# Patient Record
Sex: FEMALE | Race: WHITE | NOT HISPANIC OR LATINO | Employment: FULL TIME | URBAN - NONMETROPOLITAN AREA
[De-identification: names, ages, dates, MRNs, and addresses within clinical notes are randomized per-mention and may not be internally consistent; named-entity substitution may affect disease eponyms.]

---

## 2021-04-07 ENCOUNTER — OFFICE VISIT (OUTPATIENT)
Dept: CARDIOLOGY | Facility: CLINIC | Age: 56
End: 2021-04-07

## 2021-04-07 VITALS
BODY MASS INDEX: 34.35 KG/M2 | WEIGHT: 201.2 LBS | OXYGEN SATURATION: 96 % | SYSTOLIC BLOOD PRESSURE: 115 MMHG | HEART RATE: 70 BPM | HEIGHT: 64 IN | DIASTOLIC BLOOD PRESSURE: 76 MMHG

## 2021-04-07 DIAGNOSIS — R00.2 PALPITATIONS: Primary | ICD-10-CM

## 2021-04-07 DIAGNOSIS — R55 PRE-SYNCOPE: ICD-10-CM

## 2021-04-07 DIAGNOSIS — R07.2 PRECORDIAL PAIN: ICD-10-CM

## 2021-04-07 DIAGNOSIS — R06.02 SHORTNESS OF BREATH: ICD-10-CM

## 2021-04-07 DIAGNOSIS — R42 DIZZINESS: ICD-10-CM

## 2021-04-07 PROCEDURE — 99204 OFFICE O/P NEW MOD 45 MIN: CPT | Performed by: PHYSICIAN ASSISTANT

## 2021-04-07 PROCEDURE — 93000 ELECTROCARDIOGRAM COMPLETE: CPT | Performed by: PHYSICIAN ASSISTANT

## 2021-04-07 RX ORDER — MELOXICAM 15 MG/1
15 TABLET ORAL DAILY
COMMUNITY

## 2021-04-07 RX ORDER — PAROXETINE HYDROCHLORIDE 20 MG/1
20 TABLET, FILM COATED ORAL EVERY MORNING
COMMUNITY

## 2021-04-07 RX ORDER — ASPIRIN 81 MG/1
81 TABLET, CHEWABLE ORAL DAILY
COMMUNITY

## 2021-04-07 RX ORDER — VERAPAMIL HYDROCHLORIDE 120 MG/1
120 TABLET, FILM COATED ORAL DAILY
COMMUNITY

## 2021-04-07 RX ORDER — OMEPRAZOLE 20 MG/1
20 CAPSULE, DELAYED RELEASE ORAL 2 TIMES DAILY
COMMUNITY

## 2021-04-07 RX ORDER — FUROSEMIDE 40 MG/1
40 TABLET ORAL AS NEEDED
COMMUNITY

## 2021-04-07 NOTE — PROGRESS NOTES
Subjective   Poly Tse is a 55 y.o. female     Chief Complaint   Patient presents with   • Establish Care   • Loss of Consciousness   • Shortness of Breath       HPI  The patient presents into the clinic today for initial evaluation.  The patient is referred to the clinic today because of presyncope, palpitations, chest tightness, and dyspnea.  She apparently had a catheterization a number of years ago which was benign at the time.  She was lost to follow-up through cardiology but is now referred back because of symptoms as above.  She tells me that she has always experienced episodes of dizziness and presyncope.  She has always experienced exertional chest tightness.  She had a recent episode however where she was sitting at home.  She had dizziness for a timeframe of about 20 to 30 minutes.  This was clearly lightheadedness not necessarily vertigo by her description.  She had onset of weakness after that.  Eventually, she nearly lost consciousness.  She can remember associated tachycardia and palpitations otherwise at that time.  She did develop chest tightness.  Her episode lasted for 5 to 10 minutes and eventually subsided, but she had marked weakness throughout the next day, and possibly even extending out to a couple of days after that episode.  Since that time, she has had intermittent episodes of a rapid heartbeat as well as an irregular heartbeat.  She has chest tightness at that time.  She did talk to her primary care provider about the symptoms and has subsequently been referred on to the clinic today for further evaluation.      Current Outpatient Medications   Medication Sig Dispense Refill   • aspirin 81 MG chewable tablet Chew 81 mg Daily.     • furosemide (LASIX) 40 MG tablet Take 40 mg by mouth As Needed.     • meloxicam (MOBIC) 15 MG tablet Take 15 mg by mouth Daily.     • omeprazole (priLOSEC) 20 MG capsule Take 20 mg by mouth 2 (two) times a day.     • PARoxetine (PAXIL) 20 MG tablet Take 20 mg  by mouth Every Morning.     • verapamil (CALAN) 120 MG tablet Take 120 mg by mouth Daily.       No current facility-administered medications for this visit.       Patient has no known allergies.    Past Medical History:   Diagnosis Date   • Hyperlipidemia        Social History     Socioeconomic History   • Marital status:      Spouse name: Not on file   • Number of children: Not on file   • Years of education: Not on file   • Highest education level: Not on file   Tobacco Use   • Smoking status: Former Smoker   • Smokeless tobacco: Never Used   Substance and Sexual Activity   • Alcohol use: Never   • Drug use: Never   • Sexual activity: Defer       Family History   Problem Relation Age of Onset   • Heart disease Mother    • Diabetes Mother    • Thyroid disease Mother    • Hyperlipidemia Mother    • Heart disease Father    • Hyperlipidemia Father    • Parkinsonism Maternal Grandmother    • Lung cancer Maternal Grandfather        Review of Systems   Constitutional: Positive for fatigue. Negative for chills and fever.   HENT: Negative for congestion, rhinorrhea and sore throat.    Eyes: Positive for visual disturbance (glasses).   Respiratory: Positive for chest tightness and shortness of breath. Negative for wheezing.    Cardiovascular: Positive for palpitations. Negative for chest pain and leg swelling.   Gastrointestinal: Negative.    Endocrine: Negative.  Negative for cold intolerance.   Genitourinary: Negative.    Musculoskeletal: Positive for arthralgias, back pain and neck pain.   Skin: Negative.  Negative for rash and wound.   Allergic/Immunologic: Positive for environmental allergies.   Neurological: Positive for dizziness, weakness, numbness (fingers ) and headaches.   Hematological: Bruises/bleeds easily (bruises).   Psychiatric/Behavioral: Negative for sleep disturbance.       Objective     Vitals:    04/07/21 1444 04/07/21 1445 04/07/21 1446 04/07/21 1447   BP: 118/76 113/62 117/74 115/76   BP  "Location: Right arm Left arm Left arm Left arm   Patient Position: Lying Lying Sitting Standing   Pulse: 58 59 65 70   SpO2: 96% 97% 97% 96%   Weight:       Height:            /76 (BP Location: Left arm, Patient Position: Standing)   Pulse 70   Ht 162.6 cm (64\")   Wt 91.3 kg (201 lb 3.2 oz)   SpO2 96%   BMI 34.54 kg/m²      Lab Results (most recent)     None          Physical Exam  Vitals and nursing note reviewed.   Constitutional:       General: She is not in acute distress.     Appearance: She is well-developed.   HENT:      Head: Normocephalic and atraumatic.   Eyes:      Conjunctiva/sclera: Conjunctivae normal.      Pupils: Pupils are equal, round, and reactive to light.   Neck:      Vascular: No JVD.      Trachea: No tracheal deviation.   Cardiovascular:      Rate and Rhythm: Normal rate and regular rhythm.      Heart sounds: Normal heart sounds.   Pulmonary:      Effort: Pulmonary effort is normal.      Breath sounds: Normal breath sounds.   Abdominal:      General: Bowel sounds are normal. There is no distension.      Palpations: Abdomen is soft. There is no mass.      Tenderness: There is no abdominal tenderness. There is no guarding or rebound.   Musculoskeletal:         General: No tenderness or deformity. Normal range of motion.      Cervical back: Normal range of motion and neck supple.   Skin:     General: Skin is warm and dry.      Coloration: Skin is not pale.      Findings: No erythema or rash.   Neurological:      Mental Status: She is alert and oriented to person, place, and time.   Psychiatric:         Behavior: Behavior normal.         Thought Content: Thought content normal.         Judgment: Judgment normal.         Procedure     ECG 12 Lead    Date/Time: 4/7/2021 2:06 PM  Performed by: Gregg Dillon PA  Authorized by: Gregg Dillon PA   Comparison: not compared with previous ECG   Comments: Sinus rhythm, rate 61, normal axis, no acute changes noted.                 "       Assessment/Plan      Diagnosis Plan   1. Palpitations  Cardiac Event Monitor    Stress Test With Myocardial Perfusion    Adult Transthoracic Echo Complete W/ Cont if Necessary Per Protocol    US Carotid Bilateral   2. Pre-syncope  Cardiac Event Monitor    Stress Test With Myocardial Perfusion    Adult Transthoracic Echo Complete W/ Cont if Necessary Per Protocol    US Carotid Bilateral   3. Dizziness  Cardiac Event Monitor    Stress Test With Myocardial Perfusion    Adult Transthoracic Echo Complete W/ Cont if Necessary Per Protocol    US Carotid Bilateral   4. Precordial pain     5. Shortness of breath     1.  With the patient's episode of presyncope with associated dizziness, palpitations, chest discomfort, and complications otherwise all as outlined above, I feel that she needs further evaluation.    2.  We will schedule for an event monitor to screen for dysrhythmic substrates.  She will have an evaluation for 2 weeks with the event monitor.    3.  She also needs an ischemia assessment because of her clinical scenario as above.  We will schedule for treadmill nuclear stress testing.    4.  She will be scheduled for an echo so that we may evaluate LV size and function, valvular morphologies, right-sided parameters, etc.    5.  We will schedule for carotid duplex to evaluate for carotid and vertebral flow.    6.  For now, I would make no adjustments in her medical regimen.  We will see her back after the above and recommend her further at that time.  She will call immediately for ongoing issues.           Poly Dedrick  reports that she has quit smoking. She has never used smokeless tobacco..           Patient's Body mass index is 34.54 kg/m². BMI is above normal parameters. Recommendations include: educational material.           Electronically signed by:

## 2021-04-07 NOTE — PATIENT INSTRUCTIONS

## 2021-05-10 ENCOUNTER — HOSPITAL ENCOUNTER (OUTPATIENT)
Dept: CARDIOLOGY | Facility: HOSPITAL | Age: 56
Discharge: HOME | End: 2021-05-10

## 2021-05-10 DIAGNOSIS — R42 DIZZINESS: ICD-10-CM

## 2021-05-10 DIAGNOSIS — R00.2 PALPITATIONS: ICD-10-CM

## 2021-05-10 DIAGNOSIS — R55 PRE-SYNCOPE: ICD-10-CM

## 2021-05-10 PROCEDURE — 78452 HT MUSCLE IMAGE SPECT MULT: CPT

## 2021-05-10 PROCEDURE — 93306 TTE W/DOPPLER COMPLETE: CPT

## 2021-05-10 PROCEDURE — 78452 HT MUSCLE IMAGE SPECT MULT: CPT | Performed by: INTERNAL MEDICINE

## 2021-05-10 PROCEDURE — 93306 TTE W/DOPPLER COMPLETE: CPT | Performed by: INTERNAL MEDICINE

## 2021-05-10 PROCEDURE — 0 TECHNETIUM SESTAMIBI: Performed by: INTERNAL MEDICINE

## 2021-05-10 PROCEDURE — 93880 EXTRACRANIAL BILAT STUDY: CPT | Performed by: INTERNAL MEDICINE

## 2021-05-10 PROCEDURE — A9500 TC99M SESTAMIBI: HCPCS | Performed by: INTERNAL MEDICINE

## 2021-05-10 PROCEDURE — 93880 EXTRACRANIAL BILAT STUDY: CPT

## 2021-05-10 PROCEDURE — 93018 CV STRESS TEST I&R ONLY: CPT | Performed by: INTERNAL MEDICINE

## 2021-05-10 PROCEDURE — 93017 CV STRESS TEST TRACING ONLY: CPT

## 2021-05-10 RX ADMIN — TECHNETIUM TC 99M SESTAMIBI 1 DOSE: 1 INJECTION INTRAVENOUS at 12:43

## 2021-05-10 RX ADMIN — TECHNETIUM TC 99M SESTAMIBI 1 DOSE: 1 INJECTION INTRAVENOUS at 15:23

## 2021-05-12 LAB
BH CV REST NUCLEAR ISOTOPE DOSE: 10 MCI
BH CV STRESS NUCLEAR ISOTOPE DOSE: 30 MCI
BH CV STRESS RECOVERY BP: NORMAL MMHG
BH CV STRESS RECOVERY HR: 99 BPM
MAXIMAL PREDICTED HEART RATE: 167 BPM
PERCENT MAX PREDICTED HR: 92.22 %
STRESS BASELINE BP: NORMAL MMHG
STRESS BASELINE HR: 76 BPM
STRESS PERCENT HR: 108 %
STRESS POST ESTIMATED WORKLOAD: 7 METS
STRESS POST EXERCISE DUR MIN: 4 MIN
STRESS POST EXERCISE DUR SEC: 40 SEC
STRESS POST PEAK BP: NORMAL MMHG
STRESS POST PEAK HR: 154 BPM
STRESS TARGET HR: 142 BPM

## 2021-05-13 ENCOUNTER — TELEPHONE (OUTPATIENT)
Dept: CARDIOLOGY | Facility: CLINIC | Age: 56
End: 2021-05-13

## 2021-05-13 NOTE — TELEPHONE ENCOUNTER
Spoke with patient on stress test result - everything looked good , keep routine follow up     Patient verbalized understanding    AT [facility]      ----- Message from JOSEF Cunha sent at 5/12/2021  5:51 PM EDT -----  Routine follow-up.

## 2021-05-29 LAB
BH CV ECHO MEAS - ACS: 2 CM
BH CV ECHO MEAS - AO MAX PG: 8.2 MMHG
BH CV ECHO MEAS - AO MEAN PG: 5 MMHG
BH CV ECHO MEAS - AO ROOT AREA (BSA CORRECTED): 1.6
BH CV ECHO MEAS - AO ROOT AREA: 7.5 CM^2
BH CV ECHO MEAS - AO ROOT DIAM: 3.1 CM
BH CV ECHO MEAS - AO V2 MAX: 143 CM/SEC
BH CV ECHO MEAS - AO V2 MEAN: 102 CM/SEC
BH CV ECHO MEAS - AO V2 VTI: 33.1 CM
BH CV ECHO MEAS - BSA(HAYCOCK): 2.1 M^2
BH CV ECHO MEAS - BSA: 2 M^2
BH CV ECHO MEAS - BZI_BMI: 34.5 KILOGRAMS/M^2
BH CV ECHO MEAS - BZI_METRIC_HEIGHT: 162.6 CM
BH CV ECHO MEAS - BZI_METRIC_WEIGHT: 91.2 KG
BH CV ECHO MEAS - EDV(CUBED): 67.9 ML
BH CV ECHO MEAS - EDV(MOD-SP4): 94.2 ML
BH CV ECHO MEAS - EDV(TEICH): 73.4 ML
BH CV ECHO MEAS - EF(CUBED): 87.9 %
BH CV ECHO MEAS - EF(MOD-SP4): 53.1 %
BH CV ECHO MEAS - EF(TEICH): 82.2 %
BH CV ECHO MEAS - ESV(CUBED): 8.2 ML
BH CV ECHO MEAS - ESV(MOD-SP4): 44.2 ML
BH CV ECHO MEAS - ESV(TEICH): 13.1 ML
BH CV ECHO MEAS - FS: 50.5 %
BH CV ECHO MEAS - IVS/LVPW: 1.2
BH CV ECHO MEAS - IVSD: 1.2 CM
BH CV ECHO MEAS - LA DIMENSION: 3.8 CM
BH CV ECHO MEAS - LA/AO: 1.2
BH CV ECHO MEAS - LV DIASTOLIC VOL/BSA (35-75): 48.1 ML/M^2
BH CV ECHO MEAS - LV IVRT: 0.11 SEC
BH CV ECHO MEAS - LV MASS(C)D: 151.4 GRAMS
BH CV ECHO MEAS - LV MASS(C)DI: 77.3 GRAMS/M^2
BH CV ECHO MEAS - LV SYSTOLIC VOL/BSA (12-30): 22.5 ML/M^2
BH CV ECHO MEAS - LVIDD: 4.1 CM
BH CV ECHO MEAS - LVIDS: 2 CM
BH CV ECHO MEAS - LVLD AP4: 8.6 CM
BH CV ECHO MEAS - LVLS AP4: 6.5 CM
BH CV ECHO MEAS - LVOT AREA (M): 2.8 CM^2
BH CV ECHO MEAS - LVOT AREA: 2.8 CM^2
BH CV ECHO MEAS - LVOT DIAM: 1.9 CM
BH CV ECHO MEAS - LVPWD: 0.99 CM
BH CV ECHO MEAS - MV A MAX VEL: 67.7 CM/SEC
BH CV ECHO MEAS - MV DEC SLOPE: 294 CM/SEC^2
BH CV ECHO MEAS - MV E MAX VEL: 76.3 CM/SEC
BH CV ECHO MEAS - MV E/A: 1.1
BH CV ECHO MEAS - RVDD: 2.9 CM
BH CV ECHO MEAS - SI(AO): 127.5 ML/M^2
BH CV ECHO MEAS - SI(CUBED): 30.4 ML/M^2
BH CV ECHO MEAS - SI(MOD-SP4): 25.5 ML/M^2
BH CV ECHO MEAS - SI(TEICH): 30.8 ML/M^2
BH CV ECHO MEAS - SV(AO): 249.8 ML
BH CV ECHO MEAS - SV(CUBED): 59.7 ML
BH CV ECHO MEAS - SV(MOD-SP4): 50 ML
BH CV ECHO MEAS - SV(TEICH): 60.3 ML
BH CV XLRA MEAS LEFT CAROTID BULB EDV: 30 CM/SEC
BH CV XLRA MEAS LEFT CAROTID BULB PSV: 85 CM/SEC
BH CV XLRA MEAS LEFT DIST CCA EDV: 21 CM/SEC
BH CV XLRA MEAS LEFT DIST CCA PSV: 67 CM/SEC
BH CV XLRA MEAS LEFT DIST ICA EDV: 44 CM/SEC
BH CV XLRA MEAS LEFT DIST ICA PSV: 131 CM/SEC
BH CV XLRA MEAS LEFT ICA/CCA RATIO: 2
BH CV XLRA MEAS LEFT MID ICA EDV: 42 CM/SEC
BH CV XLRA MEAS LEFT MID ICA PSV: 98 CM/SEC
BH CV XLRA MEAS LEFT PROX CCA EDV: 34 CM/SEC
BH CV XLRA MEAS LEFT PROX CCA PSV: 134 CM/SEC
BH CV XLRA MEAS LEFT PROX ECA EDV: 18 CM/SEC
BH CV XLRA MEAS LEFT PROX ECA PSV: 94 CM/SEC
BH CV XLRA MEAS LEFT PROX ICA EDV: 34 CM/SEC
BH CV XLRA MEAS LEFT PROX ICA PSV: 78 CM/SEC
BH CV XLRA MEAS LEFT VERTEBRAL A EDV: 20 CM/SEC
BH CV XLRA MEAS LEFT VERTEBRAL A PSV: 64 CM/SEC
BH CV XLRA MEAS RIGHT CAROTID BULB EDV: 20 CM/SEC
BH CV XLRA MEAS RIGHT CAROTID BULB PSV: 75 CM/SEC
BH CV XLRA MEAS RIGHT DIST CCA EDV: 21 CM/SEC
BH CV XLRA MEAS RIGHT DIST CCA PSV: 70 CM/SEC
BH CV XLRA MEAS RIGHT DIST ICA EDV: 58 CM/SEC
BH CV XLRA MEAS RIGHT DIST ICA PSV: 129 CM/SEC
BH CV XLRA MEAS RIGHT ICA/CCA RATIO: 1.8
BH CV XLRA MEAS RIGHT MID ICA EDV: 40 CM/SEC
BH CV XLRA MEAS RIGHT MID ICA PSV: 101 CM/SEC
BH CV XLRA MEAS RIGHT PROX CCA EDV: 21 CM/SEC
BH CV XLRA MEAS RIGHT PROX CCA PSV: 96 CM/SEC
BH CV XLRA MEAS RIGHT PROX ECA EDV: 17 CM/SEC
BH CV XLRA MEAS RIGHT PROX ECA PSV: 121 CM/SEC
BH CV XLRA MEAS RIGHT PROX ICA EDV: 26 CM/SEC
BH CV XLRA MEAS RIGHT PROX ICA PSV: 72 CM/SEC
BH CV XLRA MEAS RIGHT VERTEBRAL A EDV: 15 CM/SEC
BH CV XLRA MEAS RIGHT VERTEBRAL A PSV: 42 CM/SEC
MAXIMAL PREDICTED HEART RATE: 167 BPM
MAXIMAL PREDICTED HEART RATE: 167 BPM
STRESS TARGET HR: 142 BPM
STRESS TARGET HR: 142 BPM

## 2021-06-01 ENCOUNTER — APPOINTMENT (OUTPATIENT)
Dept: CARDIOLOGY | Facility: HOSPITAL | Age: 56
End: 2021-06-01

## 2021-06-04 ENCOUNTER — TELEPHONE (OUTPATIENT)
Dept: CARDIOLOGY | Facility: CLINIC | Age: 56
End: 2021-06-04

## 2021-06-04 NOTE — TELEPHONE ENCOUNTER
Spoke to patient in Echo result     Heart pump function normal   No indication of blockages     Keep routine follow up     Patient verbalized OK     AT [facility]       ----- Message from JOSEF Cunha sent at 6/3/2021  8:49 AM EDT -----  Routine follow-up.

## 2021-06-04 NOTE — TELEPHONE ENCOUNTER
Spoke to patient on results       Blood flow is good - no issues at this time     Keep routine follow up     Patient verbalized OK     AT [facility]           ----- Message from JOSEF Cunha sent at 6/3/2021  8:48 AM EDT -----  Routine follow-up.

## 2021-08-26 ENCOUNTER — OFFICE VISIT (OUTPATIENT)
Dept: CARDIOLOGY | Facility: CLINIC | Age: 56
End: 2021-08-26

## 2021-08-26 VITALS
WEIGHT: 199.2 LBS | OXYGEN SATURATION: 96 % | BODY MASS INDEX: 34.01 KG/M2 | DIASTOLIC BLOOD PRESSURE: 80 MMHG | HEART RATE: 70 BPM | HEIGHT: 64 IN | SYSTOLIC BLOOD PRESSURE: 139 MMHG

## 2021-08-26 DIAGNOSIS — R55 PRE-SYNCOPE: ICD-10-CM

## 2021-08-26 DIAGNOSIS — R42 DIZZINESS: Primary | ICD-10-CM

## 2021-08-26 DIAGNOSIS — R00.2 PALPITATIONS: ICD-10-CM

## 2021-08-26 DIAGNOSIS — R07.2 PRECORDIAL PAIN: ICD-10-CM

## 2021-08-26 PROCEDURE — 99213 OFFICE O/P EST LOW 20 MIN: CPT | Performed by: PHYSICIAN ASSISTANT

## 2021-08-26 RX ORDER — CELECOXIB 100 MG/1
100 CAPSULE ORAL DAILY
COMMUNITY

## 2021-08-26 NOTE — PATIENT INSTRUCTIONS

## 2021-08-26 NOTE — PROGRESS NOTES
Problem list     Subjective   Poly Tse is a 56 y.o. female     Chief Complaint   Patient presents with   • Follow-up     5 month        HPI  The patient presents back to the clinic today for routine follow-up.  We had initially seen her because of symptoms of chest discomfort, dyspnea, and dizziness/presyncope.  She was scheduled for testing at that time.  She presents back today to review those studies.  Stress test indicated no evidence of ischemia.  Carotid duplex indicated nonobstructive disease in bilateral systems with no significant disease noted.  There was antegrade flow in bilateral vertebral arteries noted.  The patient's echo indicated preserved systolic function with no significant valvular issues.  The event monitor indicated sinus rhythm throughout without significant dysrhythmic activity noted.  Clinically, the patient is improved.  She tells me of only 1 more episode of dizziness.  This was very brief and nonproblematic.  She has had no further symptoms of the same.  She reports no continued chest pain.  Her dyspnea is at baseline.  She has no further complaints at this time.    Current Outpatient Medications on File Prior to Visit   Medication Sig Dispense Refill   • aspirin 81 MG chewable tablet Chew 81 mg Daily.     • celecoxib (CeleBREX) 100 MG capsule Take 100 mg by mouth Daily.     • furosemide (LASIX) 40 MG tablet Take 40 mg by mouth As Needed.     • omeprazole (priLOSEC) 20 MG capsule Take 20 mg by mouth 2 (two) times a day.     • PARoxetine (PAXIL) 20 MG tablet Take 20 mg by mouth Every Morning.     • verapamil (CALAN) 120 MG tablet Take 120 mg by mouth Daily.     • meloxicam (MOBIC) 15 MG tablet Take 15 mg by mouth Daily.       No current facility-administered medications on file prior to visit.       Patient has no known allergies.    Past Medical History:   Diagnosis Date   • Fibromyalgia    • Hyperlipidemia    • Osteoarthritis        Social History     Socioeconomic History   •  "Marital status:      Spouse name: Not on file   • Number of children: Not on file   • Years of education: Not on file   • Highest education level: Not on file   Tobacco Use   • Smoking status: Former Smoker   • Smokeless tobacco: Never Used   Substance and Sexual Activity   • Alcohol use: Never   • Drug use: Never   • Sexual activity: Defer       Family History   Problem Relation Age of Onset   • Heart disease Mother    • Diabetes Mother    • Thyroid disease Mother    • Hyperlipidemia Mother    • Heart disease Father    • Hyperlipidemia Father    • Parkinsonism Maternal Grandmother    • Lung cancer Maternal Grandfather        Review of Systems   Constitutional: Positive for fatigue. Negative for chills and fever.   HENT: Negative for congestion, rhinorrhea and sore throat.    Eyes: Positive for visual disturbance (glasses).   Respiratory: Negative.  Negative for chest tightness, shortness of breath and wheezing.    Cardiovascular: Negative.  Negative for chest pain, palpitations and leg swelling.   Gastrointestinal: Negative.    Endocrine: Negative.    Genitourinary: Negative.    Musculoskeletal: Positive for arthralgias, back pain and neck pain.   Skin: Negative.  Negative for rash and wound.   Allergic/Immunologic: Positive for environmental allergies.   Neurological: Positive for dizziness and headaches. Negative for weakness and numbness.   Hematological: Bruises/bleeds easily (bruises).   Psychiatric/Behavioral: Positive for sleep disturbance (staying asleep ).       Objective   Vitals:    08/26/21 1047   BP: 139/80   BP Location: Right arm   Patient Position: Sitting   Pulse: 70   SpO2: 96%   Weight: 90.4 kg (199 lb 3.2 oz)   Height: 162.6 cm (64\")      /80 (BP Location: Right arm, Patient Position: Sitting)   Pulse 70   Ht 162.6 cm (64\")   Wt 90.4 kg (199 lb 3.2 oz)   SpO2 96%   BMI 34.19 kg/m²    Lab Results (most recent)     None        Physical Exam  Vitals and nursing note reviewed. "   Constitutional:       General: She is not in acute distress.     Appearance: She is well-developed.   HENT:      Head: Normocephalic and atraumatic.   Eyes:      Conjunctiva/sclera: Conjunctivae normal.      Pupils: Pupils are equal, round, and reactive to light.   Neck:      Vascular: No JVD.      Trachea: No tracheal deviation.   Cardiovascular:      Rate and Rhythm: Normal rate and regular rhythm.      Heart sounds: Normal heart sounds.   Pulmonary:      Effort: Pulmonary effort is normal.      Breath sounds: Normal breath sounds.   Abdominal:      General: Bowel sounds are normal. There is no distension.      Palpations: Abdomen is soft. There is no mass.      Tenderness: There is no abdominal tenderness. There is no guarding or rebound.   Musculoskeletal:         General: No tenderness or deformity. Normal range of motion.      Cervical back: Normal range of motion and neck supple.   Skin:     General: Skin is warm and dry.      Coloration: Skin is not pale.      Findings: No erythema or rash.   Neurological:      Mental Status: She is alert and oriented to person, place, and time.   Psychiatric:         Behavior: Behavior normal.         Thought Content: Thought content normal.         Judgment: Judgment normal.           Procedure   Procedures       Assessment/Plan      Diagnosis Plan   1. Dizziness     2. Pre-syncope     3. Palpitations     4. Precordial pain       1.  At this time, the patient appears to be doing fairly well clinically.  Her dizziness is minimal and has mostly resolved.  She has had no further presyncopal episodes.  She denies continued chest pain.  She really has no further complaints otherwise of any significance.  She feels that she is now doing fairly well from general cardiovascular standpoint.    2.  Her stress test, echo, event monitor, and carotid duplex studies all were benign.  We have reviewed this in detail with her.    3.  As the patient is doing better and has had a benign  noninvasive cardiovascular evaluation, nothing further is indicated at this time.    4.  We will continue medications without change.  We will continue to see the patient on yearly evaluations at this time.              Electronically signed by:

## 2025-05-08 ENCOUNTER — OFFICE VISIT (OUTPATIENT)
Dept: CARDIOLOGY | Facility: CLINIC | Age: 60
End: 2025-05-08
Payer: COMMERCIAL

## 2025-05-08 VITALS
HEIGHT: 64 IN | HEART RATE: 71 BPM | DIASTOLIC BLOOD PRESSURE: 71 MMHG | SYSTOLIC BLOOD PRESSURE: 111 MMHG | BODY MASS INDEX: 32.44 KG/M2 | WEIGHT: 190 LBS | OXYGEN SATURATION: 98 %

## 2025-05-08 DIAGNOSIS — R06.02 SHORTNESS OF BREATH: ICD-10-CM

## 2025-05-08 DIAGNOSIS — R07.2 PRECORDIAL PAIN: Primary | ICD-10-CM

## 2025-05-08 DIAGNOSIS — R42 DIZZINESS: ICD-10-CM

## 2025-05-08 PROCEDURE — 93000 ELECTROCARDIOGRAM COMPLETE: CPT | Performed by: PHYSICIAN ASSISTANT

## 2025-05-08 PROCEDURE — 99204 OFFICE O/P NEW MOD 45 MIN: CPT | Performed by: PHYSICIAN ASSISTANT

## 2025-05-08 RX ORDER — PLECANATIDE 3 MG/1
TABLET ORAL
COMMUNITY

## 2025-05-08 RX ORDER — SOLIFENACIN SUCCINATE 5 MG/1
5 TABLET, FILM COATED ORAL DAILY
COMMUNITY

## 2025-05-08 RX ORDER — ERYTHROMYCIN ETHYLSUCCINATE 200 MG/5ML
200 SUSPENSION ORAL
COMMUNITY

## 2025-05-08 RX ORDER — ROSUVASTATIN CALCIUM 10 MG/1
10 TABLET, COATED ORAL DAILY
COMMUNITY
Start: 2025-04-30

## 2025-05-08 RX ORDER — TOPIRAMATE 25 MG/1
50 TABLET, FILM COATED ORAL DAILY
COMMUNITY

## 2025-05-08 RX ORDER — DEXLANSOPRAZOLE 60 MG/1
60 CAPSULE, DELAYED RELEASE ORAL DAILY
COMMUNITY

## 2025-05-08 RX ORDER — HYOSCYAMINE SULFATE 0.12 MG/1
0.12 TABLET ORAL EVERY 4 HOURS
COMMUNITY

## 2025-05-08 RX ORDER — NITROGLYCERIN 0.4 MG/1
TABLET SUBLINGUAL
Qty: 25 TABLET | Refills: 2 | Status: SHIPPED | OUTPATIENT
Start: 2025-05-08

## 2025-05-08 NOTE — PROGRESS NOTES
Subjective   Poly Tse is a 59 y.o. female     Chief Complaint   Patient presents with    [facility]     Re- establishing care- c/o Chest Pain    Chest Pain    Shortness of Breath    Palpitations     At night        HPI  The patient presents back to the clinic today to reestablish care.  She was last evaluated in 2021, at which time stress, carotid, echo, and event monitor findings were benign.  She was evaluated at that time for various symptoms.  She was lost to follow-up but request evaluation again today.  She has had significant concern recently because of chest pain.  She describes precordial pressure.  There is referral through to the mid back and the right jaw.  She becomes very dyspneic.  She then develops a sensation of a band of pressure around the chest.  Symptoms persist for several minutes and then eventually resolved.  She feels that symptoms are related to degree to exertion and even stress.  Baseline dyspnea has intensified slightly.  She did take her 's nitroglycerin recently because of symptoms.  She tells me that symptoms improved.  She is not sure if symptoms are related to her GI system, which is being evaluated through gastroenterology services for numerous complications, but she is concerned and would like cardiac evaluation and presents today in that setting.      Current Outpatient Medications   Medication Sig Dispense Refill    dexlansoprazole (DEXILANT) 60 MG capsule Take 1 capsule by mouth Daily.      erythromycin ethylsuccinate 200 MG/5ML suspension Take 5 mL by mouth 3 (Three) Times a Day With Meals.      furosemide (LASIX) 40 MG tablet Take 1 tablet by mouth As Needed.      hyoscyamine (ANASPAZ,LEVSIN) 0.125 MG tablet Take 1 tablet by mouth Every 4 (Four) Hours.      rosuvastatin (CRESTOR) 10 MG tablet Take 1 tablet by mouth Daily.      solifenacin (VESICARE) 5 MG tablet Take 1 tablet by mouth Daily.      topiramate (TOPAMAX) 25 MG tablet Take 2 tablets by mouth Daily.       Trulance 3 MG tablet daily      verapamil (CALAN) 120 MG tablet Take 1 tablet by mouth Daily.      nitroglycerin (NITROSTAT) 0.4 MG SL tablet 1 under the tongue as needed for angina, may repeat q5mins for up three doses 25 tablet 2     No current facility-administered medications for this visit.       Patient has no known allergies.    Past Medical History:   Diagnosis Date    Fibromyalgia     Gastroparesis     WITH DELAYED GASTRIC EMPTYING    Hyperlipidemia     IBS (irritable colon syndrome)     Migraines     Osteoarthritis        Social History     Socioeconomic History    Marital status:    Tobacco Use    Smoking status: Former     Current packs/day: 1.00     Average packs/day: 1 pack/day for 44.3 years (44.3 ttl pk-yrs)     Types: Cigarettes     Start date: 1986     Quit date: 1981    Smokeless tobacco: Never   Vaping Use    Vaping status: Never Used   Substance and Sexual Activity    Alcohol use: Never    Drug use: Never    Sexual activity: Defer       Family History   Problem Relation Age of Onset    Heart disease Mother         Stints    Diabetes Mother     Thyroid disease Mother     Hyperlipidemia Mother     Clotting disorder Mother     Hypertension Mother     Heart disease Father         Stints    Hyperlipidemia Father     Lung cancer Father     Liver cancer Father     Parkinsonism Maternal Grandmother     Lung cancer Maternal Grandfather        Review of Systems   Constitutional: Negative.  Negative for chills, diaphoresis, fatigue and fever.   HENT:  Negative for hearing loss.    Eyes: Negative.  Negative for visual disturbance.   Respiratory:  Positive for cough and shortness of breath. Negative for apnea, chest tightness and wheezing.    Cardiovascular:  Positive for chest pain, palpitations and leg swelling.   Gastrointestinal: Negative.  Negative for abdominal pain and blood in stool.   Endocrine: Negative.    Genitourinary: Negative.  Negative for hematuria.   Musculoskeletal:  Positive for  "arthralgias (OSTEO). Negative for back pain, myalgias, neck pain and neck stiffness.   Skin: Negative.  Negative for rash and wound.   Allergic/Immunologic: Negative.  Negative for environmental allergies and food allergies.   Neurological:  Positive for headaches (MIGRAINES). Negative for dizziness, syncope, weakness, light-headedness and numbness.   Hematological: Negative.  Does not bruise/bleed easily.   Psychiatric/Behavioral: Negative.  Negative for sleep disturbance.        Objective     Vitals:    05/08/25 0926   BP: 111/71   Pulse: 71   SpO2: 98%   Weight: 86.2 kg (190 lb)   Height: 162.6 cm (64\")        /71   Pulse 71   Ht 162.6 cm (64\")   Wt 86.2 kg (190 lb)   SpO2 98%   BMI 32.61 kg/m²      Lab Results (most recent)       None            Physical Exam  Vitals and nursing note reviewed.   Constitutional:       General: She is not in acute distress.     Appearance: She is well-developed.   HENT:      Head: Normocephalic and atraumatic.   Eyes:      Conjunctiva/sclera: Conjunctivae normal.      Pupils: Pupils are equal, round, and reactive to light.   Neck:      Vascular: No JVD.      Trachea: No tracheal deviation.   Cardiovascular:      Rate and Rhythm: Normal rate and regular rhythm.      Heart sounds: Normal heart sounds.   Pulmonary:      Effort: Pulmonary effort is normal.      Breath sounds: Normal breath sounds.   Abdominal:      General: Bowel sounds are normal. There is no distension.      Palpations: Abdomen is soft. There is no mass.      Tenderness: There is no abdominal tenderness. There is no guarding or rebound.   Musculoskeletal:         General: No tenderness or deformity. Normal range of motion.      Cervical back: Normal range of motion and neck supple.   Skin:     General: Skin is warm and dry.      Coloration: Skin is not pale.      Findings: No erythema or rash.   Neurological:      Mental Status: She is alert and oriented to person, place, and time.   Psychiatric:         " Behavior: Behavior normal.         Thought Content: Thought content normal.         Judgment: Judgment normal.         Procedure     ECG 12 Lead    Date/Time: 5/8/2025 9:47 AM  Performed by: Gregg Dillon PA    Authorized by: Gregg Dillon PA  Comparison: compared with previous ECG from 4/14/2021  Comparison to previous ECG: Sinus rhythm, rate 70, normal axis, no acute changes noted.               Assessment & Plan      Diagnosis Plan   1. Precordial pain  Adult Transthoracic Echo Complete W/ Cont if Necessary Per Protocol    Stress Test With Myocardial Perfusion One Day    Duplex Carotid Ultrasound CAR      2. Shortness of breath  Adult Transthoracic Echo Complete W/ Cont if Necessary Per Protocol    Stress Test With Myocardial Perfusion One Day    Duplex Carotid Ultrasound CAR      3. Dizziness  Adult Transthoracic Echo Complete W/ Cont if Necessary Per Protocol    Stress Test With Myocardial Perfusion One Day    Duplex Carotid Ultrasound CAR          1.  The patient presents in the clinic today to reestablish cardiovascular care.  She presents because of concern of recent episodes of chest pain.  She would like to have cardiac evaluation performed.    2.  I have asked that the patient be scheduled for nuclear stress test for ischemia assessment.    3.  We will schedule for an echo as well to evaluate systolic diastolic parameters, valvular morphologies, cardiac structure otherwise.    4.  With clinical scenario as above, the patient will be scheduled for carotid duplex.    5.  I did discuss aspirin therapy with her.  She was advised not to take aspirin or NSAID therapy otherwise by her gastroenterology team for now.  So, we will hold on this.  I did give her her prescription for nitroglycerin.  Medications can be adjusted further as we know results of the above.  She will return for any issues.  For any further symptoms, she will proceed onto the emergency room.         Poly Tse  reports that she quit  smoking about 44 years ago. Her smoking use included cigarettes. She started smoking about 39 years ago. She has a 44.3 pack-year smoking history. She has never used smokeless tobacco.     Electronically signed by:

## 2025-06-18 ENCOUNTER — HOSPITAL ENCOUNTER (OUTPATIENT)
Dept: CARDIOLOGY | Facility: HOSPITAL | Age: 60
Discharge: HOME | End: 2025-06-18
Payer: COMMERCIAL

## 2025-06-18 DIAGNOSIS — R06.02 SHORTNESS OF BREATH: ICD-10-CM

## 2025-06-18 DIAGNOSIS — R42 DIZZINESS: ICD-10-CM

## 2025-06-18 DIAGNOSIS — R07.2 PRECORDIAL PAIN: ICD-10-CM

## 2025-06-18 LAB
AV MEAN PRESS GRAD SYS DOP V1V2: 3.7 MMHG
AV VMAX SYS DOP: 131.7 CM/SEC
BH CV ECHO MEAS - ACS: 2.09 CM
BH CV ECHO MEAS - AO MAX PG: 6.9 MMHG
BH CV ECHO MEAS - AO ROOT DIAM: 2.9 CM
BH CV ECHO MEAS - AO V2 VTI: 29.2 CM
BH CV ECHO MEAS - EDV(CUBED): 57.5 ML
BH CV ECHO MEAS - EDV(MOD-SP4): 81.3 ML
BH CV ECHO MEAS - EF(MOD-SP4): 69.4 %
BH CV ECHO MEAS - ESV(CUBED): 12.2 ML
BH CV ECHO MEAS - ESV(MOD-SP4): 24.9 ML
BH CV ECHO MEAS - FS: 40.4 %
BH CV ECHO MEAS - IVS/LVPW: 1.01 CM
BH CV ECHO MEAS - IVSD: 1.23 CM
BH CV ECHO MEAS - LA DIMENSION: 3.7 CM
BH CV ECHO MEAS - LAT PEAK E' VEL: 10.3 CM/SEC
BH CV ECHO MEAS - LV DIASTOLIC VOL/BSA (35-75): 42.5 CM2
BH CV ECHO MEAS - LV MASS(C)D: 161.8 GRAMS
BH CV ECHO MEAS - LV SYSTOLIC VOL/BSA (12-30): 13 CM2
BH CV ECHO MEAS - LVIDD: 3.9 CM
BH CV ECHO MEAS - LVIDS: 2.3 CM
BH CV ECHO MEAS - LVPWD: 1.22 CM
BH CV ECHO MEAS - MED PEAK E' VEL: 7.7 CM/SEC
BH CV ECHO MEAS - MV A MAX VEL: 60.6 CM/SEC
BH CV ECHO MEAS - MV DEC TIME: 0.29 SEC
BH CV ECHO MEAS - MV E MAX VEL: 75.6 CM/SEC
BH CV ECHO MEAS - MV E/A: 1.25
BH CV ECHO MEAS - RVDD: 2.31 CM
BH CV ECHO MEAS - SV(MOD-SP4): 56.4 ML
BH CV ECHO MEAS - SVI(MOD-SP4): 29.5 ML/M2
BH CV ECHO MEASUREMENTS AVERAGE E/E' RATIO: 8.4
BH CV XLRA MEAS LEFT CAROTID BULB EDV: -22.5 CM/SEC
BH CV XLRA MEAS LEFT CAROTID BULB PSV: -86.6 CM/SEC
BH CV XLRA MEAS LEFT DIST CCA EDV: -28.6 CM/SEC
BH CV XLRA MEAS LEFT DIST CCA PSV: -97.9 CM/SEC
BH CV XLRA MEAS LEFT DIST ICA EDV: -47.2 CM/SEC
BH CV XLRA MEAS LEFT DIST ICA PSV: -134 CM/SEC
BH CV XLRA MEAS LEFT ICA/CCA RATIO: 1.4
BH CV XLRA MEAS LEFT MID ICA EDV: -39.9 CM/SEC
BH CV XLRA MEAS LEFT MID ICA PSV: -102 CM/SEC
BH CV XLRA MEAS LEFT PROX CCA EDV: 24.3 CM/SEC
BH CV XLRA MEAS LEFT PROX CCA PSV: 112 CM/SEC
BH CV XLRA MEAS LEFT PROX ECA PSV: -120 CM/SEC
BH CV XLRA MEAS LEFT PROX ICA EDV: -33.8 CM/SEC
BH CV XLRA MEAS LEFT PROX ICA PSV: -84 CM/SEC
BH CV XLRA MEAS LEFT VERTEBRAL A EDV: -16.5 CM/SEC
BH CV XLRA MEAS LEFT VERTEBRAL A PSV: -53.2 CM/SEC
BH CV XLRA MEAS RIGHT CAROTID BULB EDV: -18 CM/SEC
BH CV XLRA MEAS RIGHT CAROTID BULB PSV: -63.4 CM/SEC
BH CV XLRA MEAS RIGHT DIST CCA EDV: -21.1 CM/SEC
BH CV XLRA MEAS RIGHT DIST CCA PSV: -83.9 CM/SEC
BH CV XLRA MEAS RIGHT DIST ICA EDV: -26.6 CM/SEC
BH CV XLRA MEAS RIGHT DIST ICA PSV: -86.5 CM/SEC
BH CV XLRA MEAS RIGHT ICA/CCA RATIO: 1.1
BH CV XLRA MEAS RIGHT MID ICA EDV: -37.8 CM/SEC
BH CV XLRA MEAS RIGHT MID ICA PSV: -94.6 CM/SEC
BH CV XLRA MEAS RIGHT PROX CCA EDV: 20.5 CM/SEC
BH CV XLRA MEAS RIGHT PROX CCA PSV: 88.2 CM/SEC
BH CV XLRA MEAS RIGHT PROX ECA PSV: -117 CM/SEC
BH CV XLRA MEAS RIGHT PROX ICA EDV: -32.2 CM/SEC
BH CV XLRA MEAS RIGHT PROX ICA PSV: -77.1 CM/SEC
BH CV XLRA MEAS RIGHT VERTEBRAL A EDV: -18.7 CM/SEC
BH CV XLRA MEAS RIGHT VERTEBRAL A PSV: -51.6 CM/SEC
IVRT: 106 MS
LEFT ATRIUM VOLUME INDEX: 23.7 ML/M2
LV EF 3D SEGMENTATION: 69 %

## 2025-06-18 PROCEDURE — 93306 TTE W/DOPPLER COMPLETE: CPT

## 2025-06-18 PROCEDURE — 78452 HT MUSCLE IMAGE SPECT MULT: CPT

## 2025-06-18 PROCEDURE — 93017 CV STRESS TEST TRACING ONLY: CPT

## 2025-06-18 PROCEDURE — A9500 TC99M SESTAMIBI: HCPCS | Performed by: INTERNAL MEDICINE

## 2025-06-18 PROCEDURE — 93880 EXTRACRANIAL BILAT STUDY: CPT

## 2025-06-18 PROCEDURE — 34310000005 TECHNETIUM SESTAMIBI: Performed by: INTERNAL MEDICINE

## 2025-06-18 RX ADMIN — TECHNETIUM TC 99M SESTAMIBI 1 DOSE: 1 INJECTION INTRAVENOUS at 10:56

## 2025-06-21 LAB
BH CV REST NUCLEAR ISOTOPE DOSE: 10 MCI
BH CV STRESS DURATION MIN STAGE 1: 3
BH CV STRESS DURATION SEC STAGE 1: 0
BH CV STRESS GRADE STAGE 1: 10
BH CV STRESS METS STAGE 1: 5
BH CV STRESS NUCLEAR ISOTOPE DOSE: 30 MCI
BH CV STRESS PROTOCOL 1: NORMAL
BH CV STRESS RECOVERY BP: NORMAL MMHG
BH CV STRESS RECOVERY HR: 88 BPM
BH CV STRESS SPEED STAGE 1: 1.7
BH CV STRESS STAGE 1: 1
MAXIMAL PREDICTED HEART RATE: 162 BPM
PERCENT MAX PREDICTED HR: 85.8 %
STRESS BASELINE BP: NORMAL MMHG
STRESS BASELINE HR: 67 BPM
STRESS PERCENT HR: 101 %
STRESS POST ESTIMATED WORKLOAD: 9 METS
STRESS POST EXERCISE DUR MIN: 6 MIN
STRESS POST EXERCISE DUR SEC: 40 SEC
STRESS POST PEAK BP: NORMAL MMHG
STRESS POST PEAK HR: 139 BPM
STRESS TARGET HR: 138 BPM

## 2025-06-22 ENCOUNTER — RESULTS FOLLOW-UP (OUTPATIENT)
Dept: CARDIOLOGY | Facility: CLINIC | Age: 60
End: 2025-06-22
Payer: COMMERCIAL

## 2025-06-25 NOTE — PROGRESS NOTES
No evidence of significant active obstructive disease in either carotid system as detailed above. Antegrade flow in both vertebral arteries. Thyroid nodules as described by the technician are an incidental finding. The need for an type of further evaluation of thyroid abnormalities is deferred to the patient's primary healthcare providers.    Keep follow-up

## 2025-06-25 NOTE — TELEPHONE ENCOUNTER
Roman Rutledge APRN to [facility] Card [facility] Clinical Pool (Selected Message)    6/25/25 10:06 AM  Note      No evidence of significant active obstructive disease in either carotid system as detailed above. Antegrade flow in both vertebral arteries. Thyroid nodules as described by the technician are an incidental finding. The need for an type of further evaluation of thyroid abnormalities is deferred to the patient's primary healthcare providers.     Keep follow-up         Duplex Carotid Ultrasound CAR  There is no acute findings on the echocardiogram.  Keep follow-up.         Adult Transthoracic Echo Complete W/ Cont if Necessary Per Protocol